# Patient Record
Sex: FEMALE | Race: WHITE | NOT HISPANIC OR LATINO | URBAN - METROPOLITAN AREA
[De-identification: names, ages, dates, MRNs, and addresses within clinical notes are randomized per-mention and may not be internally consistent; named-entity substitution may affect disease eponyms.]

---

## 2017-01-18 ENCOUNTER — COMMUNICATION - RIVER FALLS (OUTPATIENT)
Dept: FAMILY MEDICINE | Facility: CLINIC | Age: 32
End: 2017-01-18

## 2017-01-18 ENCOUNTER — OFFICE VISIT - RIVER FALLS (OUTPATIENT)
Dept: FAMILY MEDICINE | Facility: CLINIC | Age: 32
End: 2017-01-18

## 2017-01-18 ASSESSMENT — MIFFLIN-ST. JEOR: SCORE: 1491.7

## 2017-03-01 ENCOUNTER — OFFICE VISIT - RIVER FALLS (OUTPATIENT)
Dept: FAMILY MEDICINE | Facility: CLINIC | Age: 32
End: 2017-03-01

## 2017-03-01 ASSESSMENT — MIFFLIN-ST. JEOR: SCORE: 1463.57

## 2022-02-12 VITALS
HEART RATE: 80 BPM | SYSTOLIC BLOOD PRESSURE: 112 MMHG | HEIGHT: 66 IN | BODY MASS INDEX: 28.09 KG/M2 | DIASTOLIC BLOOD PRESSURE: 68 MMHG | HEIGHT: 66 IN | WEIGHT: 174.8 LBS | BODY MASS INDEX: 27.1 KG/M2 | HEART RATE: 82 BPM | TEMPERATURE: 97.9 F | TEMPERATURE: 97.8 F | DIASTOLIC BLOOD PRESSURE: 64 MMHG | WEIGHT: 168.6 LBS | SYSTOLIC BLOOD PRESSURE: 118 MMHG

## 2022-02-15 NOTE — PROGRESS NOTES
Patient:   LAKISHA WHITE            MRN: [id number]            FIN: [id number]               Age:   31 years     Sex:  Female     :  1985   Associated Diagnoses:   Situational mixed anxiety and depressive disorder; Insomnia   Author:   Stephenie Jones      Chief Complaint   3/1/2017 8:07 AM CST     Pt presents for a medication f/u for anxiety and depression. Pt feels the anxiety is not better and is not sleeping well.      History of Present Illness   Concerning symptoms as listed in Chief Complaint above discussed and confirmed with patient.  See last note, she and new  (and his 6 yr old son Jamie) are now  after a wedding last fall. She is heart broken. Feels his affair with another woman was driven by a personality change he had after a fall from a tree stand where Lakisha resusitated him last fall.  She started seeing counselor (once ) in [place].  She will set up weekly viisits. She is workiing for [email or web address] and work is therapeutic for her although she has had to excuse her self at work and take a break because she keeps crying. She denies any thoughts of self harm, the lorazepam takes away her outward shakiness but she feels so anxious inside. She has been on sertraline 50mg for 6 weeks now         Review of Systems   PHQ 9 and CAGE questionaire are reviewed and discussed with patient, see scoring      Health Status   Allergies:    Allergic Reactions (Selected)  Severity Not Documented  Bee stings (No reactions were documented)   Medications:  (Selected)   Prescriptions  Prescribed  Ativan 0.5 mg oral tablet: 1 tab(s) ( 0.5 mg ), po, daily, Instructions: can use at bedtime  if needed and once during the day if needed, PRN: as needed for anxiety, # 30 tab(s), 1 Refill(s), Type: Maintenance, Pharmacy: [facility] [id number], 1 tab(s) po daily,PRN:as need...  sertraline 100 mg oral tablet: 1 tab(s) ( 100 mg ), PO, Daily, Instructions: this is a dose increase, # 30 tab(s), 1  Refill(s), Type: Maintenance, Pharmacy: [id number], 1 tab(s) po daily,Instr:this is a dose increase  sertraline 50 mg oral tablet: 1 tab(s) ( 50 mg ), PO, Daily, # 90 tab(s), 0 Refill(s), Type: Maintenance, Pharmacy: [facility] [id number], 1 tab(s) po daily   Problem list:    All Problems  Wears Contacts / Confirmed  History of chicken pox / SNOMED CT 038505828 / Confirmed  Obesity, Unspecified / ICD-9-.00 / Confirmed  Allergic Rhinitis, Cause Unspecified / ICD-9-.9 / Confirmed  Situational mixed anxiety and depressive disorder / SNOMED CT 181814488 / Confirmed  Insomnia, Unspecified / ICD-9-.52 / Confirmed  Family History of Other Endocrine and Metabolic Diseases / ICD-9-CM V18.19 / Confirmed  Presence of Intrauterine Contraceptive Device / ICD-9-CM V45.51 / Confirmed  Mirena  Other Specified Conditions Influencing Health Status / ICD-9-CM V49.89 / Confirmed  Resolved: Constipation, Unspecified / ICD-9-.00  Resolved: Genital HSV / SNOMED CT 04917512  Canceled: Depressive Disorder, Not Elsewhere Classified / ICD-9-  Canceled: Celiac Disease / ICD-9-.0      Histories   Past Medical History:    Active  History of chicken pox (896514359)  Resolved  Constipation, Unspecified (564.00):  Resolved.  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   Family History:    Diabetes mellitus type II  Grandmother (M)     Procedure history:    ACL - Repair of anterior cruciate ligament (SNOMED CT 732429185).  Comments:  1/27/2017 11:27 AM - Lisa Mitchell  Bilateral  Removal of mole of skin by excision (SNOMED CT 881510882).  Comments:  1/27/2017 11:30 AM - Lisa Mitchell  Left inner calf as a baby that opened up and left a circumscribed scar.   Social History:        Alcohol Assessment            Past      Tobacco Assessment            Never smoker      Substance Abuse Assessment            Never      Employment and Education Assessment            Employed, Work/School description:  .      Nutrition and Health Assessment            Type of diet: Regular.      Exercise and Physical Activity Assessment            Exercise frequency: Daily.                     Comments:                      01/19/2017 - Erin SUN Jazmín MARIALUISA                     Walking      Sexual Assessment            Sexually active: No.  Sexual orientation: Heterosexual.                     Comments:                      01/19/2017 - Erin SUN Jazmín ELAM                     was -trying for kids, currently is not sexually active        Physical Examination   Vital Signs   3/1/2017 8:07 AM CST Temperature Tympanic 97.9 DegF    Peripheral Pulse Rate 80 bpm    Systolic Blood Pressure 112 mmHg    Diastolic Blood Pressure 68 mmHg    Mean Arterial Pressure 83 mmHg    BP Site Right arm    BP Method Manual      Measurements from flowsheet : Measurements   3/1/2017 8:07 AM CST Height Measured - Standard 65.5 in    Weight Measured - Standard 168.6 lb    BSA 1.88 m2    Body Mass Index 27.63 kg/m2      General:  Alert and oriented, Mild distress, good eye contact, engaging with conversation, tearful throughout visit.    Psychiatric:  Cooperative, Appropriate mood & affect, Normal judgment, Non-suicidal.       Impression and Plan   Diagnosis     Situational mixed anxiety and depressive disorder (CEC46-OU F43.23).     Insomnia (WOI78-XN G47.00).     Patient Instructions:  Lifestyle risk factors.         Limitations: Alcohol consumption.         Counseled: Patient, Regarding diagnosis, Regarding treatment, Regarding medications, Diet, Activity, Verbalized understanding, counseled for 15 min regarding the use/risk/benefits of antidepression/anxiety medication i. Supported her efforts to heal, stay connected to family and friends which she is doing.  Increase sertraline, ok to use lorazepam bid if needed till higher dose sertraline starts to be effective, see me in 4-5 weeks, sooner if any worsening. She is in  agreement.    Orders     Orders (Selected)   Prescriptions  Prescribed  Ativan 0.5 mg oral tablet: 1 tab(s) ( 0.5 mg ), po, daily, Instructions: can use at bedtime  if needed and once during the day if needed, PRN: as needed for anxiety, # 30 tab(s), 1 Refill(s), Type: Maintenance, Pharmacy: [facility] [id number], 1 tab(s) po daily,PRN:as need...  sertraline 100 mg oral tablet: 1 tab(s) ( 100 mg ), PO, Daily, Instructions: this is a dose increase, # 30 tab(s), 1 Refill(s), Type: Maintenance, Pharmacy: [facility] [id number], 1 tab(s) po daily,Instr:this is a dose increase.

## 2022-02-15 NOTE — PROGRESS NOTES
Patient:   LAKISHA SMALLS            MRN: [id number]            FIN: [id number]               Age:   31 years     Sex:  Female     :  1985   Associated Diagnoses:   Possible pregnancy, not confirmed; Situational mixed anxiety and depressive disorder   Author:   Stephenie Jones      Chief Complaint   2017 8:10 AM CST    [facility] care, discuss marriage concerns      History of Present Illness   Concerning symptoms as listed in Chief Complaint above discussed and confirmed with patient She is new to clinic. Recently learned that her partner of 4 yrs (and  of 5 months) has left her and has another woman he is seeing. Lakisha describes herself as a 'fixer' and and calm in difficult situations. She is a  . She has talked to him about counseling but he is not interested, she is devastated as she can not fix this. Doubts drugs or ETOH involved. He does have underlying anxiety and is medicated for that. He also fell 25 ft from a tree  the fall and had severe injuries, she was the  doing CPR on him. She has seen significant personality changes in him since that event.  Currently living with a friend, does not feel physically threatened. He has a 6 year old that she has been mom to for 4 years. She has never been treated for anxiety or depression but feels like she needs some medication. Has no thoughts of self harm. Feels panic attacks, can't sleep, can't calm herself down. Was refer to me by a friend of hers and asks for help  She had IC with  , he left her , no IC since. LMP onset 1 week ago. They had been trying for a baby, she would like UPT today  PMH--healthy, hx ACL repair  Soc--nonsmoker, no ETOH use         Review of Systems   PHQ 9 and CAGE questionaire are reviewed and discussed with patient, see scoring      Health Status   Allergies:    Allergic Reactions (Selected)  Severity Not Documented  Bee stings (No reactions were documented)    Medications:  (Selected)      Problem list:    All Problems  Wears Contacts / Confirmed  Obesity, Unspecified / ICD-9-.00 / Confirmed  Allergic Rhinitis, Cause Unspecified / ICD-9-.9 / Confirmed  Insomnia, Unspecified / ICD-9-.52 / Confirmed  Family History of Other Endocrine and Metabolic Diseases / ICD-9-CM V18.19 / Confirmed  Presence of Intrauterine Contraceptive Device / ICD-9-CM V45.51 / Confirmed  Mirena  Other Specified Conditions Influencing Health Status / ICD-9-CM V49.89 / Confirmed  Resolved: Constipation, Unspecified / ICD-9-.00  Canceled: Depressive Disorder, Not Elsewhere Classified / ICD-9-  Canceled: Celiac Disease / ICD-9-.0      Histories   Past Medical History:    Resolved  Constipation, Unspecified (564.00):  Resolved.   Family History:    No family history items have been selected or recorded.   Procedure history:    No active procedure history items have been selected or recorded.   Social History:             No active social history items have been recorded.      Physical Examination   Last Menstrual Period: 1/11/2017   Vital Signs   1/18/2017 8:10 AM CST Temperature Tympanic 97.8 DegF  LOW    Peripheral Pulse Rate 82 bpm    Pulse Site Radial artery    HR Method Manual    Systolic Blood Pressure 118 mmHg    Diastolic Blood Pressure 64 mmHg    Mean Arterial Pressure 82 mmHg    BP Site Right arm    BP Method Manual      Measurements from flowsheet : Measurements   1/18/2017 8:10 AM CST Height Measured - Standard 65.5 in    Weight Measured - Standard 174.8 lb    BSA 1.91 m2    Body Mass Index 28.64 kg/m2      General:  Alert and oriented, Mild distress, good eye contact, engaging with conversation, tearful through much of conversation.    Psychiatric:  Cooperative, Appropriate mood & affect, Normal judgment, Non-suicidal.       Review / Management   Results review:  Lab results: 1/18/2017 9:19 AM CST    U Pregnancy Test          Negative  .       Impression  and Plan   Diagnosis     Possible pregnancy, not confirmed (CUZ08-QZ Z32.00).     Situational mixed anxiety and depressive disorder (EPQ83-WA F43.23).     Course:  [place] rx drug monitor website checked, no concerning behavior.    Patient Instructions:  Lifestyle risk factors.         Limitations: Alcohol consumption.         Counseled: Patient, Regarding diagnosis, Regarding treatment, Regarding medications, Diet, Activity, Verbalized understanding, counseled for 30  min regarding the use/risk/benefits of antidepression/anxiety medication including the black box warning, counseled regarding the importance of psychotherapeutic  counseling (has/given resources), counseled regarding signs of worsening that would warrant immediate return to clinic, or ER or call to campus security/suicide hot line.  Counseled regarding healthy sleep pattern and importance of diet and exercise.  Pt expresses understanding    Advise she access  ng for herself and she has resources    She should Start medication, see me in 2 months, if not able to make appt, can send me message saying how doing. Do not stop med just because couldn't get to clinic. RTC sooner if concerns    .    Orders     Orders (Selected)   Prescriptions  Prescribed  Ativan 0.5 mg oral tablet: 1 tab(s) ( 0.5 mg ), po, daily, Instructions: can use at bedtime, PRN: as needed for anxiety, # 10 tab(s), 0 Refill(s), Type: Maintenance, Pharmacy: [facility] [id number], 1 tab(s) po daily,PRN:as needed for anxiety,Instr:can use at bedtime  sertraline 50 mg oral tablet: 1 tab(s) ( 50 mg ), PO, Daily, # 30 tab(s), 2 Refill(s), Type: Maintenance, Pharmacy: [facility] [id number], 1 tab(s) po daily.